# Patient Record
Sex: MALE | Race: WHITE | HISPANIC OR LATINO | Employment: FULL TIME | URBAN - METROPOLITAN AREA
[De-identification: names, ages, dates, MRNs, and addresses within clinical notes are randomized per-mention and may not be internally consistent; named-entity substitution may affect disease eponyms.]

---

## 2020-06-24 ENCOUNTER — OFFICE VISIT (OUTPATIENT)
Dept: URGENT CARE | Facility: CLINIC | Age: 30
End: 2020-06-24
Payer: COMMERCIAL

## 2020-06-24 VITALS
HEART RATE: 68 BPM | BODY MASS INDEX: 26.34 KG/M2 | WEIGHT: 167.8 LBS | DIASTOLIC BLOOD PRESSURE: 78 MMHG | OXYGEN SATURATION: 96 % | RESPIRATION RATE: 16 BRPM | HEIGHT: 67 IN | SYSTOLIC BLOOD PRESSURE: 134 MMHG | TEMPERATURE: 98.1 F

## 2020-06-24 DIAGNOSIS — Z98.890 HISTORY OF RIGHT INGUINAL HERNIA REPAIR: ICD-10-CM

## 2020-06-24 DIAGNOSIS — R10.31 RIGHT GROIN PAIN: ICD-10-CM

## 2020-06-24 DIAGNOSIS — Z87.19 HISTORY OF RIGHT INGUINAL HERNIA REPAIR: ICD-10-CM

## 2020-06-24 LAB
APPEARANCE UR: CLEAR
BILIRUB UR STRIP-MCNC: NEGATIVE MG/DL
COLOR UR AUTO: YELLOW
GLUCOSE UR STRIP.AUTO-MCNC: NEGATIVE MG/DL
KETONES UR STRIP.AUTO-MCNC: NEGATIVE MG/DL
LEUKOCYTE ESTERASE UR QL STRIP.AUTO: NEGATIVE
NITRITE UR QL STRIP.AUTO: NEGATIVE
PH UR STRIP.AUTO: 6.5 [PH] (ref 5–8)
PROT UR QL STRIP: NEGATIVE MG/DL
RBC UR QL AUTO: NEGATIVE
SP GR UR STRIP.AUTO: 1.03
UROBILINOGEN UR STRIP-MCNC: 0.2 MG/DL

## 2020-06-24 PROCEDURE — 81002 URINALYSIS NONAUTO W/O SCOPE: CPT | Performed by: NURSE PRACTITIONER

## 2020-06-24 PROCEDURE — 99202 OFFICE O/P NEW SF 15 MIN: CPT | Performed by: NURSE PRACTITIONER

## 2020-06-24 SDOH — HEALTH STABILITY: MENTAL HEALTH: HOW OFTEN DO YOU HAVE A DRINK CONTAINING ALCOHOL?: NEVER

## 2020-06-24 ASSESSMENT — ENCOUNTER SYMPTOMS
FLANK PAIN: 0
RESPIRATORY NEGATIVE: 1
CARDIOVASCULAR NEGATIVE: 1
CHILLS: 0
VOMITING: 0
SHORTNESS OF BREATH: 0
FEVER: 0
DIARRHEA: 0
ABDOMINAL PAIN: 0
NAUSEA: 0
WHEEZING: 0
CONSTIPATION: 0

## 2020-06-24 ASSESSMENT — PAIN SCALES - GENERAL: PAINLEVEL: 10=SEVERE PAIN

## 2020-06-25 NOTE — PROGRESS NOTES
"Subjective:   Buzz Naqvi is a 30 y.o. male who presents for Abdominal Pain (started a few days ago, today worse than other days. Pt has had hernia surgery and pain is coming from there. needs note for work today. )        HPI   Patient with new onset right groin pain that started 3 days ago. States symptoms have been intermittent and waxing/waning. States today with sudden, sharp pain that last about 20 minutes and then resolved on it's own. Denies any current pain or symptoms. States with pain was located on his scar from prior hernia. Denies urinary symptoms, nausea, vomiting or diarrhea.  Denies penile discharge or risk for STDs.    Review of Systems   Constitutional: Negative for chills and fever.   Respiratory: Negative.  Negative for shortness of breath and wheezing.    Cardiovascular: Negative.  Negative for chest pain.   Gastrointestinal: Negative for abdominal pain, constipation, diarrhea, nausea and vomiting.   Genitourinary: Negative for dysuria, flank pain, frequency, hematuria and urgency.        Right groin pain     Skin: Negative.      PMH:  has no past medical history on file.  ALLERGIES: No Known Allergies    Patient's PMH, SocHx, SurgHx, FamHx, Drug allergies and medications reviewed.     Objective:   /78   Pulse 68   Temp 36.7 °C (98.1 °F) (Temporal)   Resp 16   Ht 1.702 m (5' 7\")   Wt 76.1 kg (167 lb 12.8 oz)   SpO2 96%   BMI 26.28 kg/m²   Physical Exam  Vitals signs reviewed.   Constitutional:       Appearance: He is well-developed.   HENT:      Right Ear: Hearing normal.      Left Ear: Hearing normal.   Eyes:      Conjunctiva/sclera: Conjunctivae normal.      Pupils: Pupils are equal, round, and reactive to light.   Cardiovascular:      Rate and Rhythm: Normal rate and regular rhythm.      Heart sounds: Normal heart sounds. No murmur.   Pulmonary:      Effort: Pulmonary effort is normal. No respiratory distress.      Breath sounds: Normal breath sounds.   Abdominal:   " General: Bowel sounds are normal. There is no distension.      Palpations: Abdomen is soft.      Tenderness: There is no abdominal tenderness. There is no guarding or rebound.      Hernia: There is no hernia in the right inguinal area.       Genitourinary:      Lymphadenopathy:      Lower Body: No right inguinal adenopathy.   Skin:     General: Skin is warm and dry.      Capillary Refill: Capillary refill takes less than 2 seconds.   Neurological:      Mental Status: He is alert and oriented to person, place, and time.   Psychiatric:         Behavior: Behavior normal.         Thought Content: Thought content normal.         Judgment: Judgment normal.           Assessment/Plan:   Assessment    1. Right groin pain  POCT Urinalysis    CANCELED: US-INGUINAL HERNIA   2. History of right inguinal hernia repair  CANCELED: US-INGUINAL HERNIA     UA negative  Ultrasound ordered to check for mesh position/. Unable to have outpatient imaging die to patient's insurance. Patient advised to go to ER for further evaluation or follow up with PCP.    Differential diagnosis, natural history, supportive care, and indications for immediate follow-up discussed.     **Please note that all invasive procedures during this visit were performed by myself and/or the Medical Assistant under the supervision of the PA or MD in office**

## 2023-07-10 ENCOUNTER — HOSPITAL ENCOUNTER (OUTPATIENT)
Dept: RADIOLOGY | Facility: MEDICAL CENTER | Age: 33
End: 2023-07-10
Attending: NURSE PRACTITIONER

## 2023-07-10 ENCOUNTER — OFFICE VISIT (OUTPATIENT)
Dept: URGENT CARE | Facility: PHYSICIAN GROUP | Age: 33
End: 2023-07-10

## 2023-07-10 VITALS
HEIGHT: 66 IN | HEART RATE: 66 BPM | RESPIRATION RATE: 18 BRPM | OXYGEN SATURATION: 98 % | SYSTOLIC BLOOD PRESSURE: 122 MMHG | BODY MASS INDEX: 25.71 KG/M2 | WEIGHT: 160 LBS | TEMPERATURE: 97.8 F | DIASTOLIC BLOOD PRESSURE: 64 MMHG

## 2023-07-10 DIAGNOSIS — S69.92XA INJURY OF LEFT WRIST, INITIAL ENCOUNTER: ICD-10-CM

## 2023-07-10 DIAGNOSIS — S63.502A LEFT WRIST SPRAIN, INITIAL ENCOUNTER: ICD-10-CM

## 2023-07-10 PROCEDURE — 3074F SYST BP LT 130 MM HG: CPT | Performed by: NURSE PRACTITIONER

## 2023-07-10 PROCEDURE — 73110 X-RAY EXAM OF WRIST: CPT | Mod: LT

## 2023-07-10 PROCEDURE — 3078F DIAST BP <80 MM HG: CPT | Performed by: NURSE PRACTITIONER

## 2023-07-10 PROCEDURE — 99204 OFFICE O/P NEW MOD 45 MIN: CPT | Performed by: NURSE PRACTITIONER
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
## 2023-07-26 ENCOUNTER — OCCUPATIONAL MEDICINE (OUTPATIENT)
Dept: URGENT CARE | Facility: CLINIC | Age: 33
End: 2023-07-26
Payer: COMMERCIAL

## 2023-07-26 VITALS
OXYGEN SATURATION: 99 % | DIASTOLIC BLOOD PRESSURE: 62 MMHG | WEIGHT: 167.4 LBS | TEMPERATURE: 97.6 F | RESPIRATION RATE: 20 BRPM | HEART RATE: 59 BPM | HEIGHT: 66 IN | SYSTOLIC BLOOD PRESSURE: 118 MMHG | BODY MASS INDEX: 26.9 KG/M2

## 2023-07-26 DIAGNOSIS — S61.211A LACERATION OF LEFT INDEX FINGER WITHOUT FOREIGN BODY WITHOUT DAMAGE TO NAIL, INITIAL ENCOUNTER: ICD-10-CM

## 2023-07-26 DIAGNOSIS — Z02.83 ENCOUNTER FOR DRUG SCREENING: ICD-10-CM

## 2023-07-26 DIAGNOSIS — Z02.1 PRE-EMPLOYMENT DRUG SCREENING: ICD-10-CM

## 2023-07-26 LAB
AMP AMPHETAMINE: NORMAL
BREATH ALCOHOL COMMENT: NORMAL
COC COCAINE: NORMAL
INT CON NEG: NORMAL
INT CON POS: NORMAL
MET METHAMPHETAMINES: NORMAL
OPI OPIATES: NORMAL
PCP PHENCYCLIDINE: NORMAL
POC BREATHALIZER: 0 PERCENT (ref 0–0.01)
POC DRUG COMMENT 753798-OCCUPATIONAL HEALTH: NEGATIVE
THC: NORMAL

## 2023-07-26 PROCEDURE — 29130 APPL FINGER SPLINT STATIC: CPT

## 2023-07-26 PROCEDURE — 3078F DIAST BP <80 MM HG: CPT

## 2023-07-26 PROCEDURE — 82075 ASSAY OF BREATH ETHANOL: CPT

## 2023-07-26 PROCEDURE — 12001 RPR S/N/AX/GEN/TRNK 2.5CM/<: CPT | Mod: 59

## 2023-07-26 PROCEDURE — 3074F SYST BP LT 130 MM HG: CPT

## 2023-07-26 PROCEDURE — 80305 DRUG TEST PRSMV DIR OPT OBS: CPT

## 2023-07-26 PROCEDURE — 99213 OFFICE O/P EST LOW 20 MIN: CPT | Mod: 25

## 2023-07-26 RX ORDER — DOXYCYCLINE HYCLATE 100 MG
100 TABLET ORAL 2 TIMES DAILY
Qty: 10 TABLET | Refills: 0 | Status: SHIPPED | OUTPATIENT
Start: 2023-07-26 | End: 2023-07-31

## 2023-07-26 NOTE — LETTER
"EMPLOYEE’S CLAIM FOR COMPENSATION/ REPORT OF INITIAL TREATMENT  FORM C-4  PLEASE TYPE OR PRINT    EMPLOYEE’S CLAIM - PROVIDE ALL INFORMATION REQUESTED   First Name  David Last Name  Driss Birthdate                    1990                Sex  male Claim Number (Insurer’s Use Only)   Home Address  [address] Age  33 y.o. Height  1.676 m (5' 6\") Weight  75.9 kg (167 lb 6.4 oz) [facility]     [facility] [place]  [address] Telephone  [phone] (home)    Mailing Address  [address] [facility] [place]  [address] Primary Language Spoken  English    INSURER   THIRD-PARTY         Employee's Occupation (Job Title) When Injury or Occupational Disease Occurred  [employer]    Employer's Name/Company Name     Telephone      Office Mail Address (Number and Street)          Date of Injury  7/26/2023               Hours Injury  3:50 PM Date Employer Notified  7/26/2023 Last Day of Work after Injury or Occupational Disease  7/26/2023 Supervisor to Whom Injury     Reported  tod   Address or Location of Accident (if applicable)  Work [1]   What were you doing at the time of accident? (if applicable)  working    How did this injury or occupational disease occur? (Be specific and answer in detail. Use additional sheet if necessary)  i was cutting some 2x4 and somehow the skill saw nicked me   If you believe that you have an occupational disease, when did you first have knowledge of the disability and its relationship to your employment?  na Witnesses to the Accident (if applicable)  none      Nature of Injury or Occupational Disease  Laceration  Part(s) of Body Injured or Affected  Finger (L), ,     I CERTIFY THAT THE ABOVE IS TRUE AND CORRECT TO T HE BEST OF MY KNOWLEDGE AND THAT I HAVE PROVIDED THIS INFORMATION IN ORDER TO OBTAIN THE BENEFITS OF [place], [place]).  I HEREBY AUTHORIZE ANY PHYSICIAN, CHIROPRACTOR, SURGEON, PRACTITIONER OR ANY OTHER PERSON, ANY HOSPITAL, INCLUDING [facility] OR [facility], ANY  MEDICAL SERVICE ORGANIZATION, ANY INSURANCE COMPANY, OR OTHER INSTITUTION OR ORGANIZATION TO RELEASE TO EACH OTHER, ANY MEDICAL OR OTHER INFORMATION, INCLUDING BENEFITS PAID OR PAYABLE, PERTINENT TO THIS INJURY OR DISEASE, EXCEPT INFORMATION RELATIVE TO DIAGNOSIS, TREATMENT AND/OR COUNSELING FOR AIDS, PSYCHOLOGICAL CONDITIONS, ALCOHOL OR CONTROLLED SUBSTANCES, FOR WHICH I MUST GIVE SPECIFIC AUTHORIZATION.  A PHOTOSTAT OF THIS AUTHORIZATION SHALL BE VALID AS THE ORIGINAL.       Date   Place Employee’s Original or  *Electronic Signature   THIS REPORT MUST BE COMPLETED AND MAILED WITHIN 3 WORKING DAYS OF TREATMENT   Place  [facility]  Name of Facility  [facility]   Date  7/26/2023 Diagnosis and Description of Injury or Occupational Disease  (Z02.83) Encounter for drug screening  (Z02.1) Pre-employment drug screening  (S61.211A) Laceration of left index finger without foreign body without damage to nail, initial encounter Is there evidence that the injured employee was under the influence of alcohol and/or another controlled substance at the time of accident?  ? No ? Yes (if yes, please explain)   Hour  5:41 PM   Diagnoses of Encounter for drug screening, Pre-employment drug screening, and Laceration of left index finger without foreign body without damage to nail, initial encounter were pertinent to this visit. No   Treatment  Sutures placed.  Doxycycline as prescribed, wound care as discussed  Have you advised the patient to remain off work five days or     more?    X-Ray Findings      ? Yes Indicate dates:   From   To      From information given by the employee, together with medical evidence, can        you directly connect this injury or occupational disease as job incurred?  No ? No If no,  "is the injured employee capable of:  ? full duty  Yes ? modified duty      Is additional medical care by a physician indicated?  Yes If modified duty, specify any limitations / restrictions  Work with finger splint in place   Do you know of any previous injury or disease contributing to this condition or occupational disease?  ? Yes ? No (Explain if yes)                          No   Date  7/26/2023 Print Health Care Provider's  Name  NEHAL Grady I certify that the employer’s copy of  this form was delivered to the employer on:   Address  [address] Insurer’s Use Only     [place] [facility] Zip  [address]    Provider’s Tax ID Number  [id number] Telephone  Dept: [phone]             Health Care Provider’s Original or Electronic Signature  e-SignCARTER, FAHAD CALVERT Degree (MD,DO, DC,PAHALEIGH,APRN)  APRN      * Complete and attach Release of Information (Form C-4A) when injured employee signs C-4 Form electronically  ORIGINAL - TREATING HEALTHCARE PROVIDER PAGE 2 - INSURER/TPA PAGE 3 - EMPLOYER PAGE 4 - EMPLOYEE             Form C-4 (rev.08/21)           BRIEF DESCRIPTION OF RIGHTS AND BENEFITS  (Pursuant to [place] 616[place])    Notice of Injury or Occupational Disease (Incident Report Form C-1): If an injury or occupational disease (OD) arises out of and in the course of employment, you must provide written notice to your employer as soon as practicable, but no later than 7 days after the accident or OD. Your employer shall maintain a sufficient supply of the required forms.    Claim for Compensation (Form C-4): If medical treatment is sought, the form C-4 is available at the place of initial treatment. A completed \"Claim for Compensation\" (Form C-4) must be filed within 90 days after an accident or OD. The treating physician or chiropractor must, within 3 working days after treatment, complete and mail to the employer, the employer's insurer and third-party , the Claim for " Compensation.    Medical Treatment: If you require medical treatment for your on-the-job injury or OD, you may be required to select a physician or chiropractor from a list provided by your workers’ compensation insurer, if it has contracted with an Organization for Managed Care (MCO) or Preferred Provider Organization (PPO) or providers of health care. If your employer has not entered into a contract with an MCO or PPO, you may select a physician or chiropractor from the Panel of Physicians and Chiropractors. Any medical costs related to your industrial injury or OD will be paid by your insurer.    Temporary Total Disability (TTD): If your doctor has certified that you are unable to work for a period of at least 5 consecutive days, or 5 cumulative days in a 20-day period, or places restrictions on you that your employer does not accommodate, you may be entitled to TTD compensation.    Temporary Partial Disability (TPD): If the wage you receive upon reemployment is less than the compensation for TTD to which you are entitled, the insurer may be required to pay you TPD compensation to make up the difference. TPD can only be paid for a maximum of 24 months.    Permanent Partial Disability (PPD): When your medical condition is stable and there is an indication of a PPD as a result of your injury or OD, within 30 days, your insurer must arrange for an evaluation by a rating physician or chiropractor to determine the degree of your PPD. The amount of your PPD award depends on the date of injury, the results of the PPD evaluation, your age and wage.    Permanent Total Disability (PTD): If you are medically certified by a treating physician or chiropractor as permanently and totally disabled and have been granted a PTD status by your insurer, you are entitled to receive monthly benefits not to exceed 66 2/3% of your average monthly wage. The amount of your PTD payments is subject to reduction if you previously received a  [facility]-sum PPD award.    Vocational Rehabilitation Services: You may be eligible for vocational rehabilitation services if you are unable to return to the job due to a permanent physical impairment or permanent restrictions as a result of your injury or occupational disease.    Transportation and Per Teo Reimbursement: You may be eligible for travel expenses and per teo associated with medical treatment.    Reopening: You may be able to reopen your claim if your condition worsens after claim closure.     Appeal Process: If you disagree with a written determination issued by the insurer or the insurer does not respond to your request, you may appeal to the Department of Administration, , by following the instructions contained in your determination letter. You must appeal the determination within 70 days from the date of the determination letter at [address], [address], [place], [place] [address], or [address], [address], [place], [place] [address]. If you disagree with the  decision, you may appeal to the Department of Administration, . You must file your appeal within 30 days from the date of the  decision letter at [address], [address], [place], [place] [address], or [address], [address], [place], [place] [address]. If you disagree with a decision of an , you may file a petition for judicial review with the District Court. You must do so within 30 days of the Appeal Officer’s decision. You may be represented by an  at your own expense or you may contact the [facility] for possible representation.    [place]  for Injured Workers (NAIW): If you disagree with a  decision, you may request that NAIW represent you without charge at an  Hearing. For information regarding denial of benefits, you may contact the [facility] at: [address], [address], [place]  [address], [phone], or [address], [place] [address], [phone]    To File a Complaint with the Division: If you wish to file a complaint with the  of the Division of Industrial Relations (DIR),  please contact the Workers’ Compensation Section, [address], [address], [place], [place] [address], telephone [phone], or [address], [address], [place], [place] [address], telephone [phone].    For assistance with Workers’ Compensation Issues: You may contact the [facility] Office for Consumer Health Assistance, [address], [address], [place], [place] [address], Toll Free 1-108.279.6629, Web site: [email or web address] E-mail: [email or web address]              __________________________________________________________________                                    _________________            Employee Name / Signature                                                                                                                            Date                                                                                                                                                                                                                              D-2 (rev. 10/20)

## 2023-07-26 NOTE — LETTER
[facility]  [address] [address]  Phone:  [phone] - Fax:  [phone]   [facility] Progress Report and Disability Certification  Date of Service: 7/26/2023   No Show:  No  Date / Time of Next Visit: 8/2/2023 @5:30PM   Claim Information   Patient Name: David Naqvi  Claim Number:     Employer:    Date of Injury: 7/26/2023     Insurer / TPA:    ID / SSN:     Occupation: [employer]  Diagnosis: Diagnoses of Encounter for drug screening, Pre-employment drug screening, and Laceration of left index finger without foreign body without damage to nail, initial encounter were pertinent to this visit.    Medical Information   Related to Industrial Injury?      Subjective Complaints:  HPI:  DOI: 7/26/23  DONNA: Patient presents today for Workmen's Comp. evaluation.  Patient suffered laceration to left index finger while cutting 2x4s with a skill saw.  He reports tetanus was updated 3 years ago.  He denies numbness and tingling     ROS per HPI      Objective Findings: Musculoskeletal:        Hands:       Comments: 1.5 centimeter laceration to distal tip of left index finger.  Neurovascular intact.  Full range of motion of left index finger   Skin:     General: Skin is warm and dry.       Pre-Existing Condition(s):     Assessment:   Initial Visit    Status: Additional Care Required  Permanent Disability:     Plan:   Comments:Return in 7 days for reevaluation.  Wound care as discussed in clinic.  Tylenol ibuprofen as needed for pain.  Ice application.  Work with splint in place and finger covered.    Diagnostics:      Comments:       Disability Information   Status: Released to Restricted Duty    From:  7/26/2023  Through: 8/2/2023 Restrictions are: Temporary   Physical Restrictions   Sitting:    Standing:    Stooping:    Bending:      Squatting:    Walking:    Climbing:    Pushing:      Pulling:    Other:    Reaching Above Shoulder (L):   Reaching Above  Shoulder (R):       Reaching Below Shoulder (L):    Reaching Below Shoulder (R):      Not to exceed Weight Limits   Carrying(hrs):   Weight Limit(lb):   Lifting(hrs):   Weight  Limit(lb):     Comments: Work with finger covered and splint in place.  Doxycycline as prescribed.  Alternate Tylenol ibuprofen as needed for pain.  Ice application.  Return in 7 days for reevaluation and suture removal    Repetitive Actions   Hands: i.e. Fine Manipulations from Grasping:     Feet: i.e. Operating Foot Controls:     Driving / Operate Machinery:     Health Care Provider’s Original or Electronic Signature  DEB Grady. Health Care Provider’s Original or Electronic Signature    Rei Álvarez DO MPH     Clinic Name / Location: [address] [address] Clinic Phone Number: Dept: [phone]   Appointment Time: 5:25 Pm Visit Start Time: 5:41 PM   Check-In Time:  5:26 Pm Visit Discharge Time:  6:57PM   Original-Treating Physician or Chiropractor    Page 2-Insurer/TPA    Page 3-Employer    Page 4-Employee

## 2023-07-27 NOTE — PROGRESS NOTES
"Subjective:   David Naqvi is a 33 y.o. male who presents for Laceration (Xtoday left index finger laceration/ WC)      HPI:  DOI: 7/26/23  DONNA: Patient presents today for Workmen's Comp. evaluation.  Patient suffered laceration to left index finger while cutting 2x4s with a skill saw.  He reports tetanus was updated 3 years ago.  He denies numbness and tingling    ROS per HPI        Problem list, medications, and allergies reviewed by myself today in Epic.     Objective:     /62 (BP Location: Left arm, Patient Position: Sitting)   Pulse (!) 59   Temp 36.4 °C (97.6 °F) (Temporal)   Resp 20   Ht 1.676 m (5' 6\")   Wt 75.9 kg (167 lb 6.4 oz)   SpO2 99%   BMI 27.02 kg/m²     Physical Exam  Musculoskeletal:        Hands:       Comments: 1.5 centimeter laceration to distal tip of left index finger.  Neurovascular intact.  Full range of motion of left index finger   Skin:     General: Skin is warm and dry.       Procedure: Laceration Repair of left index finger  - Risks, benefits, methods, and alternatives of primary wound closure reviewed. Risks including bleeding, nerve damage, infection, and poor cosmetic outcome discussed at length.  - Verbal consent received from patient to proceed with laceration repair with sutures.  - Wound length 1.5 cm, location left straight laceration, subcutaneous tissue visible, NVI, no signs of tendon injury.  - Area soaked with diluted chlorhexidine solution.  - Clean technique with sterile instruments.  - Area copiously irrigated with NS, wound explored, no foreign bodies identified.  - Site prepared with Betadine.  - Local anesthesia achieved with 2 mL of 1% lidocaine without epinephrine.  - Applied #6 interrupted sutures with 5 .0 Ethilon; good wound edge approximation achieved.  - Irrigated copiously with NS.  - Minimal bleeding with good hemostasis achieved.   - Antibiotic ointment and non-adhesive dressing applied and splint applied.  - There were no procedural " complications.  - Patient tolerated procedure well.        Assessment/Plan:     Diagnosis and associated orders:   1. Encounter for drug screening  POCT 6 Panel Urine Drug Screen    POCT Breath Alcohol Test      2. Pre-employment drug screening        3. Laceration of left index finger without foreign body without damage to nail, initial encounter  doxycycline (VIBRAMYCIN) 100 MG Tab             Comments/MDM:   Pt is clinically stable at today's acute urgent care visit.  No acute distress noted. Appropriate for outpatient management at this time.     Work with splint in place  Wound care discussed with patient  Doxycycline as prescribed    Sutures Used:    Patient advised to monitor for signs of infection including, but not limited to, increased redness, warmth, pain, swelling, discharge, or fever. Wound care instructions provided. Cleanse with mild soapy water at least once a day. May briefly wet, but do not soak. Keep wound clean, dry, and protected. Cover with clean dressing as needed, but replace any dressing used at least once every 24 hours or as needed. May apply ice packs, elevate, and take OTC acetaminophen/ibuprofen for pain/inflammation, per 's instructions, unless contraindicated. Suture removal in 7 days         Discussed DDx, management options (risks,benefits, and alternatives to planned treatment), natural progression and supportive care.  Expressed understanding and the treatment plan was agreed upon. Questions were encouraged and answered   Return to urgent care prn if new or worsening sx or if there is no improvement in condition prn.    Educated in Red flags and indications to immediately call 911 or present to the Emergency Department.   Advised the patient to follow-up with the primary care physician for recheck, reevaluation, and consideration of further management.          Please note that this dictation was created using voice recognition software. I have made a reasonable  attempt to correct obvious errors, but I expect that there are errors of grammar and possibly content that I did not discover before finalizing the note.    This note was electronically signed by SHERI Curtis

## 2023-08-02 ENCOUNTER — OCCUPATIONAL MEDICINE (OUTPATIENT)
Dept: URGENT CARE | Facility: CLINIC | Age: 33
End: 2023-08-02
Payer: COMMERCIAL

## 2023-08-02 VITALS
RESPIRATION RATE: 16 BRPM | HEART RATE: 55 BPM | OXYGEN SATURATION: 99 % | BODY MASS INDEX: 26.84 KG/M2 | TEMPERATURE: 97.5 F | DIASTOLIC BLOOD PRESSURE: 82 MMHG | WEIGHT: 167 LBS | SYSTOLIC BLOOD PRESSURE: 110 MMHG | HEIGHT: 66 IN

## 2023-08-02 DIAGNOSIS — S61.211D LACERATION OF LEFT INDEX FINGER WITHOUT FOREIGN BODY WITHOUT DAMAGE TO NAIL, SUBSEQUENT ENCOUNTER: ICD-10-CM

## 2023-08-02 PROCEDURE — 3079F DIAST BP 80-89 MM HG: CPT | Performed by: NURSE PRACTITIONER

## 2023-08-02 PROCEDURE — 99213 OFFICE O/P EST LOW 20 MIN: CPT | Performed by: NURSE PRACTITIONER

## 2023-08-02 PROCEDURE — 3074F SYST BP LT 130 MM HG: CPT | Performed by: NURSE PRACTITIONER

## 2023-08-02 NOTE — LETTER
[facility]  [address] [place] [address]  Phone:  [phone] - Fax:  [phone]   [facility] Progress Report and Disability Certification  Date of Service: 8/2/2023   No Show:  No  Date / Time of Next Visit: 8/9/2023   Claim Information   Patient Name: David Naqvi  Claim Number:     Employer:    Date of Injury: 7/26/2023     Insurer / TPA: M[employer] Workers Comp  ID / SSN:     Occupation: [employer]  Diagnosis: There were no encounter diagnoses.    Medical Information   Related to Industrial Injury?      Subjective Complaints:  COPIED: DOI: 7/26/23  DONNA: Patient presents today for Workmen's Comp. evaluation.  Patient suffered laceration to left index finger while cutting 2x4s with a skill saw.  He reports tetanus was updated 3 years ago.  He denies numbness and tingling    F/V #1:  Patient returns for follow up of his left index finger laceration on 7/26/2023 which required #6 sutures.  Patient reports overall his finger feels good and is here for suture removal today.  He states he can feel the tip of his finger.  He denies any numbness. He was able to work with his finger laceration since the injury.  He denies any redness, swelling or drainage from the finger.    Objective Findings: Physical Exam  Musculoskeletal: Left index finger with well healing laceration.  The wound edges are well approximated. There is scabbing to the incision.  #6 sutures were removed today.  Some of the scabbing was debrided today in order to get the remaining sutures out.  There is no evidence of infection. Wound is clean and dry.  Band-Aid was applied.     Pre-Existing Condition(s):     Assessment:   Condition Improved    Status: Additional Care Required  Permanent Disability:     Plan:      Diagnostics:      Comments:       Disability Information   Status: Released to Full Duty    From:  8/2/2023  Through: 8/9/2023 Restrictions are: Temporary   Physical Restrictions    Sitting:    Standing:    Stooping:    Bending:      Squatting:    Walking:    Climbing:    Pushing:      Pulling:    Other:    Reaching Above Shoulder (L):   Reaching Above Shoulder (R):       Reaching Below Shoulder (L):    Reaching Below Shoulder (R):      Not to exceed Weight Limits   Carrying(hrs):   Weight Limit(lb):   Lifting(hrs):   Weight  Limit(lb):     Comments: Laceration, left index finger, healing well.  Restrictions per D39  Keep finger covered while at work, then open to air  Return in one week for wound check, anticipate MMI at that time  Return sooner for any signs of infection - increased redness, pain, swelling, or drainage from the wound    Repetitive Actions   Hands: i.e. Fine Manipulations from Grasping:     Feet: i.e. Operating Foot Controls:     Driving / Operate Machinery:     Health Care Provider’s Original or Electronic Signature  DEB Ray. Health Care Provider’s Original or Electronic Signature    Rei Álvarez DO MPH     Clinic Name / Location: [facility] [address] [address] Clinic Phone Number: Dept: [phone]   Appointment Time: 5:30 Pm Visit Start Time: 6:03 PM   Check-In Time:  5:34 Pm Visit Discharge Time: 6:31 Pm    Original-Treating Physician or Chiropractor    Page 2-Insurer/TPA    Page 3-Employer    Page 4-Employee

## 2023-08-03 NOTE — PROGRESS NOTES
"Subjective:     David Naqvi is a 33 y.o. male who presents for Suture / Staple Removal ( follow up for Suture removal from LT. index)      COPIED: DOI: 7/26/23  DONNA: Patient presents today for Workmen's Comp. evaluation.  Patient suffered laceration to left index finger while cutting 2x4s with a skill saw.  He reports tetanus was updated 3 years ago.  He denies numbness and tingling    F/V #1:  Patient returns for follow up of his left index finger laceration on 7/26/2023 which required #6 sutures.  Patient reports overall his finger feels good and is here for suture removal today.  He states he can feel the tip of his finger.  He denies any numbness. He was able to work with his finger laceration since the injury.  He denies any redness, swelling or drainage from the finger.     PMH:   No pertinent past medical history to this problem  MEDS:  Medications were reviewed in EMR  ALLERGIES:  Allergies were reviewed in EMR  SOCH[employer]:  Works as a [employer]  FH:   No pertinent family history to this problem       Objective:     /82 (BP Location: Left arm, Patient Position: Sitting, BP Cuff Size: Adult)   Pulse (!) 55   Temp 36.4 °C (97.5 °F) (Temporal)   Resp 16   Ht 1.676 m (5' 6\")   Wt 75.8 kg (167 lb)   SpO2 99%   BMI 26.95 kg/m²     Physical Exam  Musculoskeletal: Left index finger with well healing laceration.  The wound edges are well approximated. There is scabbing to the incision.  #6 sutures were removed today.  Some of the scabbing was debrided today in order to get the remaining sutures out.  There is no evidence of infection. Wound is clean and dry.  Band-Aid was applied.      Assessment/Plan:       1. Laceration of left index finger without foreign body without damage to nail, subsequent encounter    Released to Full Duty FROM 8/2/2023 TO 8/9/2023  Laceration, left index finger, healing well.  Restrictions per D39  Keep finger covered while at work, then open to air  Return in one week for wound " check, anticipate MMI at that time  Return sooner for any signs of infection - increased redness, pain, swelling, or drainage from the wound       Differential diagnosis, natural history, supportive care, and indications for immediate follow-up discussed.

## 2023-08-15 ENCOUNTER — APPOINTMENT (OUTPATIENT)
Dept: URGENT CARE | Facility: CLINIC | Age: 33
End: 2023-08-15

## 2023-08-16 ENCOUNTER — OCCUPATIONAL MEDICINE (OUTPATIENT)
Dept: URGENT CARE | Facility: CLINIC | Age: 33
End: 2023-08-16
Payer: COMMERCIAL

## 2023-08-16 VITALS
TEMPERATURE: 96.9 F | OXYGEN SATURATION: 99 % | BODY MASS INDEX: 26.21 KG/M2 | DIASTOLIC BLOOD PRESSURE: 70 MMHG | WEIGHT: 167 LBS | SYSTOLIC BLOOD PRESSURE: 120 MMHG | RESPIRATION RATE: 14 BRPM | HEIGHT: 67 IN | HEART RATE: 60 BPM

## 2023-08-16 DIAGNOSIS — S61.211D LACERATION OF LEFT INDEX FINGER WITHOUT FOREIGN BODY WITHOUT DAMAGE TO NAIL, SUBSEQUENT ENCOUNTER: ICD-10-CM

## 2023-08-16 PROCEDURE — 3078F DIAST BP <80 MM HG: CPT | Performed by: NURSE PRACTITIONER

## 2023-08-16 PROCEDURE — 3074F SYST BP LT 130 MM HG: CPT | Performed by: NURSE PRACTITIONER

## 2023-08-16 PROCEDURE — 99213 OFFICE O/P EST LOW 20 MIN: CPT | Performed by: NURSE PRACTITIONER

## 2023-08-16 NOTE — LETTER
[facility]  [address]  [place] [address]  Phone:  [phone] - Fax:  [phone]   [facility] Progress Report and Disability Certification  Date of Service: 8/16/2023   No Show:  No  Date / Time of Next Visit:     Claim Information   Patient Name: David Naqvi  Claim Number:     Employer:    Date of Injury: 7/26/2023     Insurer / TPA: Misc Workers Comp  ID / SSN:     Occupation: [employer]  Diagnosis: The encounter diagnosis was Laceration of left index finger without foreign body without damage to nail, subsequent encounter.    Medical Information   Related to Industrial Injury? Yes    Subjective Complaints:  DOI: 7/26/23 DONNA: Patient sustained a laceration of his left index finger while cutting a 2 x 4.  He unfortunately was cut with a saw.  He was seen and the laceration was repaired with 6 interrupted Ethilon sutures.  He was placed on prophylactic antibiotics of doxycycline.  He returned to clinic for his second visit he did have the sutures removed successfully he was returned to work full duty.  He presents to clinic today for his third follow-up he reports work has been going well.  He denies any pain numbness tingling drainage or swelling.  He denies any fevers or body aches.  He states he is ready to close Workmen's Comp.    Objective Findings: Left index finger healing laceration.  Nontender.  There is no erythema drainage or swelling.  No bony tenderness.  Range of motion intact.  Distal neurovascular status is grossly intact.   Pre-Existing Condition(s):     Assessment:   Condition Improved    Status: Discharged /  MMI  Permanent Disability:No    Plan:      Diagnostics:      Comments:       Disability Information   Status: Released to Full Duty    From:     Through:   Restrictions are: Permanent   Physical Restrictions   Sitting:    Standing:    Stooping:    Bending:      Squatting:    Walking:    Climbing:    Pushing:      Pulling:     Other:    Reaching Above Shoulder (L):   Reaching Above Shoulder (R):       Reaching Below Shoulder (L):    Reaching Below Shoulder (R):      Not to exceed Weight Limits   Carrying(hrs):   Weight Limit(lb):   Lifting(hrs):   Weight  Limit(lb):     Comments: Healed laceration of the left index finger.  Discharge MMI    Repetitive Actions   Hands: i.e. Fine Manipulations from Grasping:     Feet: i.e. Operating Foot Controls:     Driving / Operate Machinery:     Health Care Provider’s Original or Electronic Signature  NEHAL [facility] Health Care Provider’s Original or Electronic Signature    Rei Álvarez DO MPH     Clinic Name / Location: [address] [address] Clinic Phone Number: Dept: [phone]   Appointment Time: 5:30 Pm Visit Start Time: 5:42 PM   Check-In Time:  5:29 Pm Visit Discharge Time:  6:18pm   Original-Treating Physician or Chiropractor    Page 2-Insurer/TPA    Page 3-Employer    Page 4-Employee

## 2023-08-17 NOTE — PROGRESS NOTES
"Subjective:     David Naqvi is a 33 y.o. male who presents for Follow-Up (Follow up for finger laceration, work comp. )      DOI: 7/26/23 DONNA: Patient sustained a laceration of his left index finger while cutting a 2 x 4.  He unfortunately was cut with a saw.  He was seen and the laceration was repaired with 6 interrupted Ethilon sutures.  He was placed on prophylactic antibiotics of doxycycline.  He returned to clinic for his second visit he did have the sutures removed successfully he was returned to work full duty.  He presents to clinic today for his third follow-up he reports work has been going well.  He denies any pain numbness tingling drainage or swelling.  He denies any fevers or body aches.  He states he is ready to close Workmen's Comp.     PMH:   No pertinent past medical history to this problem  MEDS:  Medications were reviewed in EMR  ALLERGIES:  Allergies were reviewed in EMR  FH:   No pertinent family history to this problem       Objective:     /70 (BP Location: Left arm, Patient Position: Sitting, BP Cuff Size: Adult)   Pulse 60   Temp 36.1 °C (96.9 °F) (Temporal)   Resp 14   Ht 1.702 m (5' 7\")   Wt 75.8 kg (167 lb)   SpO2 99%   BMI 26.16 kg/m²     Left index finger healing laceration.  Nontender.  There is no erythema drainage or swelling.  No bony tenderness.  Range of motion intact.  Distal neurovascular status is grossly intact.    Assessment/Plan:     Previous visits reviewed by myself.    1. Laceration of left index finger without foreign body without damage to nail, subsequent encounter    Released to Full Duty FROM   TO    Healed laceration of the left index finger.  Discharge MMI       Differential diagnosis, natural history, supportive care, and indications for immediate follow-up discussed.    "

## 2023-09-28 ENCOUNTER — OFFICE VISIT (OUTPATIENT)
Dept: URGENT CARE | Facility: CLINIC | Age: 33
End: 2023-09-28

## 2023-09-28 VITALS
TEMPERATURE: 97.7 F | HEART RATE: 78 BPM | SYSTOLIC BLOOD PRESSURE: 118 MMHG | WEIGHT: 167 LBS | OXYGEN SATURATION: 100 % | BODY MASS INDEX: 26.84 KG/M2 | HEIGHT: 66 IN | DIASTOLIC BLOOD PRESSURE: 70 MMHG | RESPIRATION RATE: 14 BRPM

## 2023-09-28 DIAGNOSIS — R10.13 ACUTE EPIGASTRIC PAIN: ICD-10-CM

## 2023-09-28 PROCEDURE — 99214 OFFICE O/P EST MOD 30 MIN: CPT | Performed by: NURSE PRACTITIONER

## 2023-09-28 PROCEDURE — 3078F DIAST BP <80 MM HG: CPT | Performed by: NURSE PRACTITIONER

## 2023-09-28 PROCEDURE — 3074F SYST BP LT 130 MM HG: CPT | Performed by: NURSE PRACTITIONER

## 2023-09-28 ASSESSMENT — ENCOUNTER SYMPTOMS
CHILLS: 0
HEARTBURN: 0
DIARRHEA: 0
VOMITING: 1
NEUROLOGICAL NEGATIVE: 1
CARDIOVASCULAR NEGATIVE: 1
FEVER: 0
NAUSEA: 0
RESPIRATORY NEGATIVE: 1
BLOOD IN STOOL: 0
CONSTITUTIONAL NEGATIVE: 1
MUSCULOSKELETAL NEGATIVE: 1
CONSTIPATION: 0
ABDOMINAL PAIN: 1
BELCHING: 1
EYES NEGATIVE: 1

## 2023-09-28 NOTE — PROGRESS NOTES
Subjective:   David Naqvi is a 33 y.o. male who presents for Abdominal Pain (X3 days)      Patient presents for evaluation of epigastric abdominal pain that began approximately 3 days ago.  Patient states that he has been vomiting, but he thinks this is because when he drinks water he feels that it just sits there and he burps the water up, and vomits for relief.  He states that he has been having normal bowel movements, his last 1 was yesterday.  He does not have any diarrhea or fever or chills.  He states that due to the pain he is not eating, and is only really drinking water.  He has not tried any over-the-counter medications such as Pepto-Bismol or antacids for this.  He does not have any history of reflux disease.  He states the pain is localized to the epigastric area, and does not go anywhere else.    Abdominal Pain  This is a new problem. Episode onset: 3 days. The onset quality is sudden. The problem occurs constantly. The problem has been gradually worsening. The pain is located in the epigastric region. The pain is at a severity of 10/10. The pain is severe. The quality of the pain is sharp. The abdominal pain does not radiate. Associated symptoms include belching and vomiting. Pertinent negatives include no constipation, diarrhea, fever, melena or nausea. Nothing aggravates the pain. The pain is relieved by Nothing. He has tried nothing for the symptoms.       Review of Systems   Constitutional: Negative.  Negative for chills and fever.   HENT: Negative.     Eyes: Negative.    Respiratory: Negative.     Cardiovascular: Negative.    Gastrointestinal:  Positive for abdominal pain and vomiting. Negative for blood in stool, constipation, diarrhea, heartburn, melena and nausea.   Genitourinary: Negative.    Musculoskeletal: Negative.    Skin: Negative.    Neurological: Negative.        Medications, Allergies, and current problem list reviewed today in Epic.     Objective:     /70 (BP Location: Left  "arm, Patient Position: Sitting, BP Cuff Size: Adult)   Pulse 78   Temp 36.5 °C (97.7 °F) (Temporal)   Resp 14   Ht 1.676 m (5' 6\")   Wt 75.8 kg (167 lb)   SpO2 100%     Physical Exam  Vitals reviewed.   Constitutional:       General: He is not in acute distress.     Appearance: Normal appearance.   HENT:      Head: Normocephalic and atraumatic.      Nose: Nose normal.      Mouth/Throat:      Mouth: Mucous membranes are moist.      Pharynx: Oropharynx is clear.   Eyes:      Extraocular Movements: Extraocular movements intact.      Conjunctiva/sclera: Conjunctivae normal.      Pupils: Pupils are equal, round, and reactive to light.   Cardiovascular:      Rate and Rhythm: Normal rate and regular rhythm.      Pulses: Normal pulses.      Heart sounds: Normal heart sounds.   Pulmonary:      Effort: Pulmonary effort is normal.      Breath sounds: Normal breath sounds.   Abdominal:      General: Abdomen is flat. Bowel sounds are normal.      Palpations: Abdomen is soft.      Tenderness: There is abdominal tenderness in the epigastric area. There is no right CVA tenderness, left CVA tenderness, guarding or rebound. Negative signs include Salinas's sign, Rovsing's sign, McBurney's sign, psoas sign and obturator sign.      Hernia: No hernia is present.      Comments: Patient does have pain in the epigastrium on palpation.  The rest of the abdominal exam is benign, with no tenderness to palpation anywhere else.  He has no rebound, no guarding, no masses or organomegaly noted.   Musculoskeletal:         General: Normal range of motion.      Cervical back: Normal range of motion and neck supple.   Skin:     General: Skin is warm and dry.      Capillary Refill: Capillary refill takes less than 2 seconds.   Neurological:      General: No focal deficit present.      Mental Status: He is alert and oriented to person, place, and time.   Psychiatric:         Mood and Affect: Mood normal.         Behavior: Behavior normal. "         Assessment/Plan:     Diagnosis and associated orders:     1. Acute epigastric pain           Comments/MDM:     Patient was given 30 mils of Maalox and 20 mils of viscous lidocaine in the clinic today with absolutely no improvement in his pain after 15 minutes.  Discussed with patient that within the limits of urgent care, we do not have the ability to perform further work-up for his pain today.  Discussed with patient that due to his pain level at a 10/10, I do feel further evaluation in the ER is indicated.  I do feel he is clinically stable to drive himself there by private vehicle.  Patient verbalized understanding and is in agreement with plan for higher level of care.  Patient will go to ER now.         Differential diagnosis, natural history, supportive care, and indications for immediate follow-up discussed.    Advised the patient to follow-up with the primary care physician for recheck, reevaluation, and consideration of further management.    Please note that this dictation was created using voice recognition software. I have made a reasonable attempt to correct obvious errors, but I expect that there are errors of grammar and possibly content that I did not discover before finalizing the note.    This note was electronically signed by SHERI Dyson